# Patient Record
Sex: FEMALE | Race: WHITE | URBAN - METROPOLITAN AREA
[De-identification: names, ages, dates, MRNs, and addresses within clinical notes are randomized per-mention and may not be internally consistent; named-entity substitution may affect disease eponyms.]

---

## 2021-09-28 ENCOUNTER — OFFICE VISIT (OUTPATIENT)
Dept: URBAN - METROPOLITAN AREA CLINIC 50 | Facility: CLINIC | Age: 41
End: 2021-09-28

## 2021-11-11 ENCOUNTER — DASHBOARD ENCOUNTERS (OUTPATIENT)
Age: 41
End: 2021-11-11

## 2021-11-11 ENCOUNTER — WEB ENCOUNTER (OUTPATIENT)
Dept: URBAN - METROPOLITAN AREA CLINIC 96 | Facility: CLINIC | Age: 41
End: 2021-11-11

## 2021-11-11 ENCOUNTER — OFFICE VISIT (OUTPATIENT)
Dept: URBAN - METROPOLITAN AREA CLINIC 96 | Facility: CLINIC | Age: 41
End: 2021-11-11

## 2021-11-11 ENCOUNTER — OFFICE VISIT (OUTPATIENT)
Dept: URBAN - METROPOLITAN AREA CLINIC 96 | Facility: CLINIC | Age: 41
End: 2021-11-11
Payer: COMMERCIAL

## 2021-11-11 VITALS
SYSTOLIC BLOOD PRESSURE: 106 MMHG | TEMPERATURE: 98.3 F | WEIGHT: 170 LBS | HEIGHT: 66 IN | HEART RATE: 85 BPM | DIASTOLIC BLOOD PRESSURE: 72 MMHG | BODY MASS INDEX: 27.32 KG/M2

## 2021-11-11 DIAGNOSIS — K21.9 GASTROESOPHAGEAL REFLUX DISEASE, UNSPECIFIED WHETHER ESOPHAGITIS PRESENT: ICD-10-CM

## 2021-11-11 DIAGNOSIS — R19.7 DIARRHEA, UNSPECIFIED TYPE: ICD-10-CM

## 2021-11-11 DIAGNOSIS — R19.6 BAD BREATH: ICD-10-CM

## 2021-11-11 PROBLEM — 79879001: Status: ACTIVE | Noted: 2021-11-11

## 2021-11-11 PROBLEM — 235595009: Status: ACTIVE | Noted: 2021-11-11

## 2021-11-11 PROCEDURE — 99204 OFFICE O/P NEW MOD 45 MIN: CPT | Performed by: INTERNAL MEDICINE

## 2021-11-11 PROCEDURE — 91065 BREATH HYDROGEN/METHANE TEST: CPT | Performed by: INTERNAL MEDICINE

## 2021-11-11 NOTE — HPI-TODAY'S VISIT:
moved from [place] 2 years ago always had stomach issues diarrhea w/ certain foods first GI had an EGD/colonosocpy --> chronic gastritis without bleeding, r/o microscopic colitis, diverticulosis, bx results were normal per patient. advised to take probiotics, 2017 took probiotics, did not improve went to another GI in [place]. told her she has GERD. thinks she did a UBT, took some strong abx. does not think things got better. having indigestion, burning, has to sit up, needs help breathing went gluten free for a little while, worked to help  normal BMs are loose stools, multikple x a day, pattern a long time. going gluten free for 3 months, things improved. 40 lb weight gain bad taste in mouth no covid, is covid vaccinated special ED private tutoring used to cleanses

## 2021-11-17 LAB
ADDITIONAL INFORMATION:: (no result)
C-REACTIVE PROTEIN, QUANT: 2
COMMENT:: (no result)
DEAMIDATED GLIADIN ABS, IGA: 3
DEAMIDATED GLIADIN ABS, IGG: 2
DQ2 (DQA1 0501/0505,DQB1 02XX): POSITIVE
DQ8 (DQA1 03XX, DQB1 0302): NEGATIVE
ENDOMYSIAL ANTIBODY IGA: NEGATIVE
IMMUNOGLOBULIN A, QN, SERUM: 242
T-TRANSGLUTAMINASE (TTG) IGA: <2
T-TRANSGLUTAMINASE (TTG) IGG: 2
T4,FREE(DIRECT): 1.14
TSH: 1.22

## 2021-12-07 ENCOUNTER — LAB OUTSIDE AN ENCOUNTER (OUTPATIENT)
Dept: URBAN - METROPOLITAN AREA CLINIC 96 | Facility: CLINIC | Age: 41
End: 2021-12-07

## 2021-12-12 LAB
C. DIFFICILE TOXIN A/B, STOOL - QDX: NEGATIVE
CALPROTECTIN, STOOL - QDX: (no result)
FECAL FAT, QUALITATIVE: (no result)
GASTROINTESTINAL PATHOGEN: (no result)
OVA AND PARASITE - QDX: (no result)

## 2021-12-13 ENCOUNTER — WEB ENCOUNTER (OUTPATIENT)
Dept: URBAN - METROPOLITAN AREA CLINIC 50 | Facility: CLINIC | Age: 41
End: 2021-12-13

## 2021-12-13 RX ORDER — RIFAXIMIN 550 MG/1
1 TABLET TABLET ORAL THREE TIMES A DAY
Qty: 42 TABLET | Refills: 0 | OUTPATIENT